# Patient Record
Sex: FEMALE | Race: WHITE | NOT HISPANIC OR LATINO | URBAN - METROPOLITAN AREA
[De-identification: names, ages, dates, MRNs, and addresses within clinical notes are randomized per-mention and may not be internally consistent; named-entity substitution may affect disease eponyms.]

---

## 2024-10-25 ENCOUNTER — TRANSCRIBE ORDERS (OUTPATIENT)
Facility: HOSPITAL | Age: 34
End: 2024-10-25

## 2024-10-25 DIAGNOSIS — O09.899 SUPERVISION OF OTHER HIGH RISK PREGNANCY, ANTEPARTUM: Primary | ICD-10-CM

## 2024-11-22 ENCOUNTER — ROUTINE PRENATAL (OUTPATIENT)
Dept: PERINATAL CARE | Facility: OTHER | Age: 34
End: 2024-11-22
Payer: COMMERCIAL

## 2024-11-22 VITALS
BODY MASS INDEX: 28.6 KG/M2 | DIASTOLIC BLOOD PRESSURE: 68 MMHG | SYSTOLIC BLOOD PRESSURE: 112 MMHG | WEIGHT: 167.5 LBS | HEART RATE: 55 BPM | HEIGHT: 64 IN

## 2024-11-22 DIAGNOSIS — Z36.82 ENCOUNTER FOR NUCHAL TRANSLUCENCY TESTING: ICD-10-CM

## 2024-11-22 DIAGNOSIS — O09.899 SUPERVISION OF OTHER HIGH RISK PREGNANCY, ANTEPARTUM: ICD-10-CM

## 2024-11-22 DIAGNOSIS — Z3A.13 13 WEEKS GESTATION OF PREGNANCY: Primary | ICD-10-CM

## 2024-11-22 DIAGNOSIS — O09.521 MULTIGRAVIDA OF ADVANCED MATERNAL AGE IN FIRST TRIMESTER: ICD-10-CM

## 2024-11-22 PROCEDURE — 76801 OB US < 14 WKS SINGLE FETUS: CPT | Performed by: OBSTETRICS & GYNECOLOGY

## 2024-11-22 PROCEDURE — 76813 OB US NUCHAL MEAS 1 GEST: CPT | Performed by: OBSTETRICS & GYNECOLOGY

## 2024-11-22 PROCEDURE — 99203 OFFICE O/P NEW LOW 30 MIN: CPT | Performed by: OBSTETRICS & GYNECOLOGY

## 2024-11-22 RX ORDER — FOLIC ACID/MULTIVIT,IRON,MINER 0.4MG-18MG
TABLET ORAL
COMMUNITY

## 2024-11-22 RX ORDER — KETOCONAZOLE 20 MG/ML
SHAMPOO, SUSPENSION TOPICAL 2 TIMES WEEKLY
COMMUNITY
Start: 2024-09-24

## 2024-11-22 NOTE — PROGRESS NOTES
Radha presents today for  genetic screening.  This is her second pregnancy.  She has a history of a prior full-term vaginal livery without complications.  She had noninvasive prenatal testing.    The patient had noninvasive prenatal testing through [facility] using the [id number] test.  Her results were normal, placing her in a very low risk category.  The sensitivity of detecting Trisomy 21 with this test is 99.1% with a specificity of 99.9%.  The sensitivity of detecting Trisomy 18 is >99.9% with a specificity of 99.6%.  The sensitivity of detecting Trisomy 13 is 91.7% with a specificity of 99.7%.  Her negative result is very reassuring that the likelihood of her having a fetus with the aforementioned Trisomies is very low.    Today's ultrasound is overall reassuring without concerns for a congenital heart defect.  We discussed the limitations of ultrasound at this gestational age and follow-up.  A level 2 ultrasound is recommended at around 20 weeks gestation.    Thank you very much for allowing us to participate in the care of this very nice patient.  Should you have any questions, please do not hesitate to contact our office.

## 2025-01-22 ENCOUNTER — ROUTINE PRENATAL (OUTPATIENT)
Dept: PERINATAL CARE | Facility: OTHER | Age: 35
End: 2025-01-22
Payer: COMMERCIAL

## 2025-01-22 VITALS
DIASTOLIC BLOOD PRESSURE: 62 MMHG | WEIGHT: 172.4 LBS | BODY MASS INDEX: 29.43 KG/M2 | SYSTOLIC BLOOD PRESSURE: 114 MMHG | HEIGHT: 64 IN | HEART RATE: 73 BPM

## 2025-01-22 DIAGNOSIS — O09.522 MULTIGRAVIDA OF ADVANCED MATERNAL AGE IN SECOND TRIMESTER: ICD-10-CM

## 2025-01-22 DIAGNOSIS — Z3A.22 22 WEEKS GESTATION OF PREGNANCY: Primary | ICD-10-CM

## 2025-01-22 DIAGNOSIS — Z36.86 ENCOUNTER FOR ANTENATAL SCREENING FOR CERVICAL LENGTH: ICD-10-CM

## 2025-01-22 PROCEDURE — 76817 TRANSVAGINAL US OBSTETRIC: CPT | Performed by: OBSTETRICS & GYNECOLOGY

## 2025-01-22 PROCEDURE — 76811 OB US DETAILED SNGL FETUS: CPT | Performed by: OBSTETRICS & GYNECOLOGY

## 2025-01-22 PROCEDURE — 99213 OFFICE O/P EST LOW 20 MIN: CPT | Performed by: OBSTETRICS & GYNECOLOGY

## 2025-01-22 NOTE — PROGRESS NOTES
The patient was seen today for an ultrasound.  Please see ultrasound report (located under Ob Procedures) for additional details.   Thank you very much for allowing us to participate in the care of this very nice patient.  Should you have any questions, please do not hesitate to contact me.     Amrit Mccullough MD FACOG  Attending Physician, Maternal-Fetal Medicine  [facility]

## 2025-01-22 NOTE — PROGRESS NOTES
Ultrasound Probe Disinfection    A transvaginal ultrasound was performed.   Prior to use, disinfection was performed with High Level Disinfection Process ([facility]).  Probe serial number [id number] was used.    Iraida Eastman  01/22/25  2:25 PM

## 2025-04-07 ENCOUNTER — ULTRASOUND (OUTPATIENT)
Dept: PERINATAL CARE | Facility: OTHER | Age: 35
End: 2025-04-07
Payer: COMMERCIAL

## 2025-04-07 ENCOUNTER — TELEPHONE (OUTPATIENT)
Age: 35
End: 2025-04-07

## 2025-04-07 VITALS
WEIGHT: 178.8 LBS | SYSTOLIC BLOOD PRESSURE: 114 MMHG | HEART RATE: 71 BPM | BODY MASS INDEX: 30.52 KG/M2 | DIASTOLIC BLOOD PRESSURE: 58 MMHG | HEIGHT: 64 IN

## 2025-04-07 DIAGNOSIS — O09.523 ELDERLY MULTIGRAVIDA, THIRD TRIMESTER: ICD-10-CM

## 2025-04-07 DIAGNOSIS — Z3A.33 33 WEEKS GESTATION OF PREGNANCY: Primary | ICD-10-CM

## 2025-04-07 PROCEDURE — 76816 OB US FOLLOW-UP PER FETUS: CPT | Performed by: OBSTETRICS & GYNECOLOGY

## 2025-04-07 NOTE — LETTER
April 7, 2025     Janny Ash DO  [address]  [place] [address]    Patient: Radha Burns   YOB: 1990   Date of Visit: 4/7/2025       Dear Dr. Janny Ash DO:    Thank you for referring Radha Burns to me for evaluation. Below are my notes for this consultation.    If you have questions, please do not hesitate to call me. I look forward to following your patient along with you.         Sincerely,        Pro Martinez MD        CC: No Recipients    Pro Martinez MD  4/7/2025 11:06 AM  Sign when Signing Visit  Please refer to the [facility] ultrasound report in Ob Procedures for additional information regarding today's visit

## 2025-04-07 NOTE — TELEPHONE ENCOUNTER
I checked in Misc Reports and Dr. Mccullough faxed over 4/7/25 ultrasound report to O at [phone] @[facility] pm

## 2025-04-07 NOTE — TELEPHONE ENCOUNTER
Dot from [facility] in [place] called requesting that we fax over the patient's US report from 4/7/25 because the patient would like to do her next growth US with them in stead of in [facility]. Can this be done?    Fax# [phone]